# Patient Record
Sex: MALE | Race: WHITE | NOT HISPANIC OR LATINO | Employment: OTHER | URBAN - METROPOLITAN AREA
[De-identification: names, ages, dates, MRNs, and addresses within clinical notes are randomized per-mention and may not be internally consistent; named-entity substitution may affect disease eponyms.]

---

## 2017-03-10 RX ORDER — AMLODIPINE BESYLATE 5 MG/1
TABLET ORAL
Qty: 90 TABLET | Refills: 1 | Status: SHIPPED | OUTPATIENT
Start: 2017-03-10 | End: 2017-03-14 | Stop reason: SDUPTHER

## 2017-03-10 RX ORDER — ROSUVASTATIN CALCIUM 20 MG/1
TABLET, FILM COATED ORAL
Qty: 90 TABLET | Refills: 1 | Status: SHIPPED | OUTPATIENT
Start: 2017-03-10 | End: 2017-03-14 | Stop reason: SDUPTHER

## 2017-03-14 RX ORDER — AMLODIPINE BESYLATE 5 MG/1
5 TABLET ORAL DAILY
Qty: 90 TABLET | Refills: 1 | Status: SHIPPED | OUTPATIENT
Start: 2017-03-14 | End: 2017-09-08 | Stop reason: SDUPTHER

## 2017-03-14 RX ORDER — ROSUVASTATIN CALCIUM 20 MG/1
20 TABLET, COATED ORAL DAILY
Qty: 90 TABLET | Refills: 1 | Status: SHIPPED | OUTPATIENT
Start: 2017-03-14 | End: 2017-09-08

## 2017-06-06 RX ORDER — LOSARTAN POTASSIUM 100 MG/1
TABLET ORAL
Qty: 90 TABLET | Refills: 1 | Status: SHIPPED | OUTPATIENT
Start: 2017-06-06 | End: 2017-12-03 | Stop reason: SDUPTHER

## 2017-08-01 ENCOUNTER — TELEPHONE (OUTPATIENT)
Dept: CARDIOLOGY | Facility: CLINIC | Age: 72
End: 2017-08-01

## 2017-08-01 NOTE — TELEPHONE ENCOUNTER
Patient states he has been having pain in his left side for a couple weeks, and he read an article in the [employer] that said statins may cause this pain.  He recently received the generic form or Crestor and wanted to know if this formulation could be the issue.    Patient will hold the medication for ten days and call back with results.

## 2017-09-08 RX ORDER — AMLODIPINE BESYLATE 5 MG/1
5 TABLET ORAL DAILY
Qty: 90 TABLET | Refills: 2 | Status: SHIPPED | OUTPATIENT
Start: 2017-09-08 | End: 2018-06-10 | Stop reason: SDUPTHER

## 2017-09-08 RX ORDER — HYDROCHLOROTHIAZIDE 25 MG/1
TABLET ORAL
Qty: 90 TABLET | Refills: 2 | OUTPATIENT
Start: 2017-09-08

## 2017-09-08 RX ORDER — ROSUVASTATIN CALCIUM 20 MG/1
TABLET, COATED ORAL
Qty: 90 TABLET | Refills: 1 | OUTPATIENT
Start: 2017-09-08

## 2017-09-08 RX ORDER — AMLODIPINE BESYLATE 5 MG/1
TABLET ORAL
Qty: 90 TABLET | Refills: 1 | OUTPATIENT
Start: 2017-09-08

## 2017-09-08 RX ORDER — ROSUVASTATIN CALCIUM 20 MG/1
20 TABLET, FILM COATED ORAL DAILY
Qty: 90 TABLET | Refills: 2 | Status: SHIPPED | OUTPATIENT
Start: 2017-09-08 | End: 2017-12-21 | Stop reason: SDUPTHER

## 2017-09-08 RX ORDER — HYDROCHLOROTHIAZIDE 25 MG/1
25 TABLET ORAL DAILY
Qty: 90 TABLET | Refills: 2 | Status: SHIPPED | OUTPATIENT
Start: 2017-09-08 | End: 2018-07-10 | Stop reason: SDUPTHER

## 2017-10-13 ENCOUNTER — TELEPHONE (OUTPATIENT)
Dept: CARDIOLOGY | Facility: CLINIC | Age: 72
End: 2017-10-13

## 2017-10-13 NOTE — TELEPHONE ENCOUNTER
Patient was denied for brand named Crestor.  Patient wants to discuss options.    LVM for patient stating if he signs denial letter we can submit an appeal.  Gave him our fax number.

## 2017-11-14 ENCOUNTER — OFFICE VISIT (OUTPATIENT)
Dept: NEUROSURGERY | Facility: CLINIC | Age: 72
End: 2017-11-14

## 2017-11-14 VITALS — BODY MASS INDEX: 35.84 KG/M2 | HEIGHT: 69 IN | TEMPERATURE: 98.2 F | WEIGHT: 242 LBS

## 2017-11-14 DIAGNOSIS — M47.812 CERVICAL SPONDYLOSIS WITHOUT MYELOPATHY: ICD-10-CM

## 2017-11-14 DIAGNOSIS — M54.12 CERVICAL RADICULOPATHY: Primary | ICD-10-CM

## 2017-11-14 DIAGNOSIS — M47.812 CERVICAL ARTHRITIS: ICD-10-CM

## 2017-11-14 DIAGNOSIS — M50.30 DEGENERATIVE DISC DISEASE, CERVICAL: ICD-10-CM

## 2017-11-14 PROCEDURE — 99213 OFFICE O/P EST LOW 20 MIN: CPT | Performed by: NEUROLOGICAL SURGERY

## 2017-11-14 NOTE — PROGRESS NOTES
Patient: Agustín Child  : 1945    Primary Care Provider: Estefany Goldstein MD    Requesting Provider: As above      History    Chief Complaint: Neck and left arm pain.    History of Present Illness: Mr. Child is a 72 old  who is well-known to my service.  On 8/24/15 he underwent L4-5 decompressive laminectomy.  He's generally done well with that.  In July of this year he developed some neck and left arm pain.  Physical therapy was attempted but worsened his pain.  He's had trouble sleeping as a result of the pain.  The last several days he has been wearing a hard collar at night and his symptoms are improved.  He sometimes has tingling in his left hand.  He has no right arm symptoms.  He is worse in the mornings and at the end of the day.  He generally does better when he is up and about.  The pain extends into the left upper arm and occasionally to the wrist.  He has no pain in his hand or fingers.    Review of Systems   Constitutional: Negative for activity change, appetite change, chills, diaphoresis, fatigue, fever and unexpected weight change.   HENT: Negative for congestion, dental problem, drooling, ear discharge, ear pain, facial swelling, hearing loss, mouth sores, nosebleeds, postnasal drip, rhinorrhea, sinus pressure, sneezing, sore throat, tinnitus, trouble swallowing and voice change.    Eyes: Negative for photophobia, pain, discharge, redness, itching and visual disturbance.   Respiratory: Negative for apnea, cough, choking, chest tightness, shortness of breath, wheezing and stridor.    Cardiovascular: Negative for chest pain, palpitations and leg swelling.   Gastrointestinal: Negative for abdominal distention, abdominal pain, anal bleeding, blood in stool, constipation, diarrhea, nausea, rectal pain and vomiting.   Endocrine: Negative for cold intolerance, heat intolerance, polydipsia, polyphagia and polyuria.   Genitourinary: Negative for decreased urine volume, difficulty urinating,  "dysuria, enuresis, flank pain, frequency, genital sores, hematuria and urgency.   Musculoskeletal: Positive for myalgias and neck pain. Negative for arthralgias, back pain, gait problem, joint swelling and neck stiffness.   Skin: Negative for color change, pallor, rash and wound.   Allergic/Immunologic: Negative for environmental allergies, food allergies and immunocompromised state.   Neurological: Negative for dizziness, tremors, seizures, syncope, facial asymmetry, speech difficulty, weakness, light-headedness, numbness and headaches.   Hematological: Negative for adenopathy. Does not bruise/bleed easily.   Psychiatric/Behavioral: Negative for agitation, behavioral problems, confusion, decreased concentration, dysphoric mood, hallucinations, self-injury, sleep disturbance and suicidal ideas. The patient is not nervous/anxious and is not hyperactive.        The patient's past medical history, past surgical history, family history, and social history have been reviewed at length in the electronic medical record.    Physical Exam:   Temp 98.2 °F (36.8 °C) (Temporal Artery )   Ht 69\" (175.3 cm)  Wt 242 lb (110 kg)  BMI 35.74 kg/m2  CONSTITUTIONAL: Patient is well-nourished, pleasant and appears stated age.  CV: Heart regular rate and rhythm without murmur, rub, or gallop.  PULMONARY: Lungs are clear to ascultation.  MUSCULOSKELETAL:  Neck tenderness to palpation is not observed.   ROM in neck is normal.  NEUROLOGICAL:  Orientation, memory, attention span, language function, and cognition have been examined and are intact.  Strength is intact in the upper and lower extremities to direct testing.  Muscle tone is normal throughout.  Station and gait are normal.  Sensation is intact to light touch testing throughout.  Deep tendon reflexes are 1+ and symmetrical.  Deonte's Sign is negative bilaterally. No clonus is elicited.  Coordination is intact.      Medical Decision Making    Data Review:   MRI of the cervical " spine is a limited study.  There are only axial slices through C3-4.  I suspect they've neglected to put the other sequences on the disc.  There is diffuse degenerative disc disease and spondylosis.  There is some broad-based spurring at multiple levels.  There is a low-grade offset of C7 on T1.    Diagnosis:   Cervical spondylosis with left upper extremity radiculopathy, improved.    Treatment Options:   The patient is going to give this time.  He will continue his collar use at night.  If his symptoms persist or worsen then I recommended either epidural injections or cervical CT myelography to delineate the specific sites of nerve root compromise.  He is a good deal of traveling planned.  He'll contact me if we need to pursue further treatment measures.       Diagnosis Plan   1. Cervical radiculopathy     2. Cervical spondylosis without myelopathy     3. Cervical arthritis     4. Degenerative disc disease, cervical             I, Dr. Shaw, personally performed the services described in the documentation, as scribed in my presence, and it is both accurate and complete.

## 2017-12-04 RX ORDER — LOSARTAN POTASSIUM 100 MG/1
TABLET ORAL
Qty: 90 TABLET | Refills: 1 | Status: SHIPPED | OUTPATIENT
Start: 2017-12-04 | End: 2018-06-10 | Stop reason: SDUPTHER

## 2017-12-21 RX ORDER — ROSUVASTATIN CALCIUM 20 MG/1
20 TABLET, COATED ORAL DAILY
Qty: 90 TABLET | Refills: 2 | Status: SHIPPED | OUTPATIENT
Start: 2017-12-21 | End: 2018-09-11 | Stop reason: SDUPTHER

## 2018-01-12 ENCOUNTER — OFFICE VISIT (OUTPATIENT)
Dept: CARDIOLOGY | Facility: CLINIC | Age: 73
End: 2018-01-12

## 2018-01-12 VITALS
HEART RATE: 77 BPM | BODY MASS INDEX: 35.25 KG/M2 | SYSTOLIC BLOOD PRESSURE: 118 MMHG | WEIGHT: 238 LBS | HEIGHT: 69 IN | DIASTOLIC BLOOD PRESSURE: 64 MMHG

## 2018-01-12 DIAGNOSIS — I10 ESSENTIAL HYPERTENSION: Primary | ICD-10-CM

## 2018-01-12 DIAGNOSIS — E78.5 DYSLIPIDEMIA: ICD-10-CM

## 2018-01-12 DIAGNOSIS — R74.8 ELEVATED CPK: ICD-10-CM

## 2018-01-12 PROCEDURE — 99213 OFFICE O/P EST LOW 20 MIN: CPT | Performed by: INTERNAL MEDICINE

## 2018-01-12 RX ORDER — NITROFURANTOIN MACROCRYSTALS 100 MG/1
100 CAPSULE ORAL 2 TIMES DAILY PRN
COMMUNITY
End: 2018-08-17

## 2018-01-12 RX ORDER — HYDROCODONE BITARTRATE AND ACETAMINOPHEN 7.5; 325 MG/1; MG/1
TABLET ORAL AS NEEDED
Refills: 0 | COMMUNITY
Start: 2018-01-03 | End: 2018-08-17

## 2018-01-12 RX ORDER — GLIMEPIRIDE 2 MG/1
1 TABLET ORAL 2 TIMES DAILY
COMMUNITY
End: 2018-08-17

## 2018-01-12 RX ORDER — FINASTERIDE 5 MG/1
5 TABLET, FILM COATED ORAL DAILY
COMMUNITY
End: 2018-08-17

## 2018-01-12 NOTE — PROGRESS NOTES
Encounter Date:01/12/2018      Patient ID: Agustín Child is a 72 y.o. male.    Chief Complaint: No chief complaint on file.      PROBLEM LIST:  1. Hypertension:  a. GXT myocardial perfusion study, 11/24/2009:  No  evidence of reversible ischemia, normal left ventricular systolic function, left ventricular ejection fraction of 63%.  Impaired exercise tolerance with a 38% HENRY.  b. Echocardiogram, 03/13/2012: Normal LV systolic function, EF 55% to 60%, normal valvular  morphology.    2. Dyslipidemia.  3. CPK elevation secondary to simvastatin.  4. Obesity, BMI 35.4.  5. Asthma.  6. Benign prostatic hypertrophy.  7. Glaucoma.  8. Osteoarthritis.  9. Diverticulosis.    History of Present Illness  Patient presents today for follow-up with a history of hypertension and other cardiac risk factors. Is doing well from a cardiac standpoint. Recently went in to have umbilical hernia surgery, and afterwards had constipation which he attributed to the anesthesia and eating certain foods.  Symptoms continued so he went to the ED and was diagnosed with a UTI/sepsis with leukocytosis.  He was given an enema, treated with antibiotics and discharged with instructions to follow-up with a urologist.  His antihypertensives were adjusted. Since then he has been experiencing fatigue and mild lower extremity swelling. Denies chest pain, shortness of breath, leg swelling, palpitations, and syncope. Remains busy and active and plans to start walking regularly when he is in [place] for next 3 weeks.      No Known Allergies      Current Outpatient Prescriptions:   •  amLODIPine (NORVASC) 5 MG tablet, Take 1 tablet by mouth Daily., Disp: 90 tablet, Rfl: 2  •  aspirin 325 MG tablet, Take 325 mg by mouth Daily., Disp: , Rfl:   •  B Complex Vitamins (B COMPLEX 100 PO), Take  by mouth Daily., Disp: , Rfl:   •  budesonide-formoterol (SYMBICORT) 160-4.5 MCG/ACT inhaler, Inhale 2 puffs 2 (Two) Times a Day., Disp: , Rfl:   •  cholecalciferol  (VITAMIN D3) 1000 UNITS tablet, Take 2,000 Units by mouth Daily., Disp: , Rfl:   •  cycloSPORINE (RESTASIS) 0.05 % ophthalmic emulsion, Administer 1 drop to both eyes 2 (Two) Times a Day., Disp: , Rfl:   •  finasteride (PROSCAR) 5 MG tablet, Take 5 mg by mouth Daily., Disp: , Rfl:   •  fluticasone (FLONASE) 50 MCG/ACT nasal spray, 2 sprays into each nostril As Needed., Disp: , Rfl:   •  Glucosamine HCl (GLUCOSAMINE PO), Take 1 tablet by mouth Daily., Disp: , Rfl:   •  GUAIFENESIN PO, Take 400 mg by mouth 2 (Two) Times a Day As Needed., Disp: , Rfl:   •  hydrochlorothiazide (HYDRODIURIL) 25 MG tablet, Take 1 tablet by mouth Daily., Disp: 90 tablet, Rfl: 2  •  HYDROcodone-acetaminophen (NORCO) 7.5-325 MG per tablet, As Needed., Disp: , Rfl: 0  •  ipratropium (ATROVENT) 0.03 % nasal spray, 2 sprays into each nostril Every 12 (Twelve) Hours., Disp: , Rfl:   •  losartan (COZAAR) 100 MG tablet, TAKE 1 TABLET DAILY, Disp: 90 tablet, Rfl: 1  •  nitrofurantoin (MACRODANTIN) 100 MG capsule, Take 100 mg by mouth 2 (Two) Times a Day As Needed., Disp: , Rfl:   •  rosuvastatin (CRESTOR) 20 MG tablet, Take 1 tablet by mouth Daily., Disp: 90 tablet, Rfl: 2  •  tamsulosin (FLOMAX) 0.4 MG capsule 24 hr capsule, Take 1 capsule by mouth Every Night., Disp: , Rfl:   •  thiamine (VITAMIN B-1) 100 MG tablet, Take 100 mg by mouth Daily., Disp: , Rfl:   •  timolol (TIMOPTIC) 0.25 % ophthalmic solution, 1 drop 2 (Two) Times a Day., Disp: , Rfl:   •  vitamin E 400 UNIT capsule, Take 400 Units by mouth Daily., Disp: , Rfl:     The following portions of the patient's history were reviewed and updated as appropriate: allergies, current medications, past family history, past medical history, past social history, past surgical history and problem list.    ROS  Review of Systems   Constitution: Negative for chills, fever, weight gain and weight loss.   Cardiovascular: Negative for chest pain, claudication, dyspnea on exertion, leg swelling,  "orthopnea, palpitations, paroxysmal nocturnal dyspnea and syncope.        No dizziness   Gastrointestinal: Negative for abdominal pain, constipation, diarrhea, nausea and vomiting.   Genitourinary:        No urinary symptoms   Neurological:        No symptoms of stroke.   All other systems reviewed and are negative.    Objective:     Blood pressure 118/64, pulse 77, height 175.3 cm (69\"), weight 108 kg (238 lb).        Physical Exam  Constitutional: She appears well-developed and well-nourished.   HENT:   HEENT exam unremarkable.   Neck: Neck supple. No JVD present.   No carotid bruits.   Cardiovascular: Normal rate, regular rhythm and normal heart sounds.    No murmur heard.  2 plus symmetric pulses.   Pulmonary/Chest: Breath sounds normal. Does not exhibit tenderness.   Abdominal:   Abdomen benign.   Musculoskeletal: Does not exhibit edema.   Neurological:   Neurological exam unremarkable.   Vitals reviewed.    Lab Review:   Procedures       Assessment:      Diagnosis Plan   1. Essential hypertension  Compensated. Well controlled. Continue current medications. Exercise and control diet.   2. Dyslipidemia  Well controlled. Continue current medication regimen   3. Elevated CPK due to certain statins By history  Tolerating Crestor.       Plan:   Overall cardiac status is stable, he was encouraged to increase activities, exercise regularly also limited calories and try to lose weight specially abdominal fat.  Continue current medications, okay to take lower dose of HCTZ as prescribed on hospital discharge.  His blood pressure is well controlled.   FU in 6 MO, sooner as needed.  Thank you for allowing us to participate in the care of your patient.     Scribed for Vick Goldstein MD by Behzad Carnes. 1/12/2018  10:17 AM    I, Vick Goldstein MD, personally performed the services described in this documentation as scribed by the above named individual in my presence, and it is both accurate and complete.  1/12/2018  10:18 " AM        Please note that portions of this note may have been completed with a voice recognition program. Efforts were made to edit the dictations, but occasionally words are mistranscribed.

## 2018-02-13 ENCOUNTER — LAB (OUTPATIENT)
Dept: LAB | Facility: HOSPITAL | Age: 73
End: 2018-02-13
Attending: INTERNAL MEDICINE

## 2018-02-13 DIAGNOSIS — L40.50 PSA (PSORIATIC ARTHRITIS) (HCC): ICD-10-CM

## 2018-02-13 DIAGNOSIS — R74.8 ELEVATED CPK: ICD-10-CM

## 2018-02-13 DIAGNOSIS — I10 ESSENTIAL HYPERTENSION: ICD-10-CM

## 2018-02-13 DIAGNOSIS — E78.5 DYSLIPIDEMIA: ICD-10-CM

## 2018-02-13 DIAGNOSIS — N39.0 URINARY TRACT INFECTION WITHOUT HEMATURIA, SITE UNSPECIFIED: Primary | ICD-10-CM

## 2018-02-13 LAB
ALBUMIN SERPL-MCNC: 4.1 G/DL (ref 3.2–4.8)
ALBUMIN/GLOB SERPL: 1.8 G/DL (ref 1.5–2.5)
ALP SERPL-CCNC: 93 U/L (ref 25–100)
ALT SERPL W P-5'-P-CCNC: 36 U/L (ref 7–40)
ANION GAP SERPL CALCULATED.3IONS-SCNC: 3 MMOL/L (ref 3–11)
AST SERPL-CCNC: 26 U/L (ref 0–33)
BACTERIA UR QL AUTO: ABNORMAL /HPF
BASOPHILS # BLD AUTO: 0.05 10*3/MM3 (ref 0–0.2)
BASOPHILS NFR BLD AUTO: 0.5 % (ref 0–1)
BILIRUB SERPL-MCNC: 0.8 MG/DL (ref 0.3–1.2)
BILIRUB UR QL STRIP: NEGATIVE
BUN BLD-MCNC: 22 MG/DL (ref 9–23)
BUN/CREAT SERPL: 18.3 (ref 7–25)
CALCIUM SPEC-SCNC: 9.3 MG/DL (ref 8.7–10.4)
CHLORIDE SERPL-SCNC: 108 MMOL/L (ref 99–109)
CLARITY UR: CLEAR
CO2 SERPL-SCNC: 30 MMOL/L (ref 20–31)
COLOR UR: YELLOW
CREAT BLD-MCNC: 1.2 MG/DL (ref 0.6–1.3)
CRP SERPL-MCNC: 1.35 MG/DL (ref 0–1)
DEPRECATED RDW RBC AUTO: 46.7 FL (ref 37–54)
EOSINOPHIL # BLD AUTO: 0.42 10*3/MM3 (ref 0–0.3)
EOSINOPHIL NFR BLD AUTO: 4.4 % (ref 0–3)
ERYTHROCYTE [DISTWIDTH] IN BLOOD BY AUTOMATED COUNT: 14.7 % (ref 11.3–14.5)
ERYTHROCYTE [SEDIMENTATION RATE] IN BLOOD: 25 MM/HR (ref 0–20)
GFR SERPL CREATININE-BSD FRML MDRD: 59 ML/MIN/1.73
GLOBULIN UR ELPH-MCNC: 2.3 GM/DL
GLUCOSE BLD-MCNC: 96 MG/DL (ref 70–100)
GLUCOSE UR STRIP-MCNC: NEGATIVE MG/DL
HCT VFR BLD AUTO: 40.1 % (ref 38.9–50.9)
HGB BLD-MCNC: 13.6 G/DL (ref 13.1–17.5)
HGB UR QL STRIP.AUTO: NEGATIVE
HYALINE CASTS UR QL AUTO: ABNORMAL /LPF
IMM GRANULOCYTES # BLD: 0.05 10*3/MM3 (ref 0–0.03)
IMM GRANULOCYTES NFR BLD: 0.5 % (ref 0–0.6)
KETONES UR QL STRIP: NEGATIVE
LEUKOCYTE ESTERASE UR QL STRIP.AUTO: NEGATIVE
LYMPHOCYTES # BLD AUTO: 2.31 10*3/MM3 (ref 0.6–4.8)
LYMPHOCYTES NFR BLD AUTO: 24.2 % (ref 24–44)
MCH RBC QN AUTO: 29.6 PG (ref 27–31)
MCHC RBC AUTO-ENTMCNC: 33.9 G/DL (ref 32–36)
MCV RBC AUTO: 87.4 FL (ref 80–99)
MONOCYTES # BLD AUTO: 0.72 10*3/MM3 (ref 0–1)
MONOCYTES NFR BLD AUTO: 7.5 % (ref 0–12)
MUCOUS THREADS URNS QL MICRO: ABNORMAL /HPF
NEUTROPHILS # BLD AUTO: 6 10*3/MM3 (ref 1.5–8.3)
NEUTROPHILS NFR BLD AUTO: 62.9 % (ref 41–71)
NITRITE UR QL STRIP: NEGATIVE
PH UR STRIP.AUTO: <=5 [PH] (ref 5–8)
PLATELET # BLD AUTO: 173 10*3/MM3 (ref 150–450)
PMV BLD AUTO: 10.2 FL (ref 6–12)
POTASSIUM BLD-SCNC: 4.1 MMOL/L (ref 3.5–5.5)
PROT SERPL-MCNC: 6.4 G/DL (ref 5.7–8.2)
PROT UR QL STRIP: NEGATIVE
RBC # BLD AUTO: 4.59 10*6/MM3 (ref 4.2–5.76)
RBC # UR: ABNORMAL /HPF
REF LAB TEST METHOD: ABNORMAL
SODIUM BLD-SCNC: 141 MMOL/L (ref 132–146)
SP GR UR STRIP: 1.02 (ref 1–1.03)
SQUAMOUS #/AREA URNS HPF: ABNORMAL /HPF
UROBILINOGEN UR QL STRIP: NORMAL
WBC NRBC COR # BLD: 9.55 10*3/MM3 (ref 3.5–10.8)
WBC UR QL AUTO: ABNORMAL /HPF

## 2018-02-13 PROCEDURE — 36415 COLL VENOUS BLD VENIPUNCTURE: CPT

## 2018-02-13 PROCEDURE — 85025 COMPLETE CBC W/AUTO DIFF WBC: CPT

## 2018-02-13 PROCEDURE — 87086 URINE CULTURE/COLONY COUNT: CPT

## 2018-02-13 PROCEDURE — 85652 RBC SED RATE AUTOMATED: CPT

## 2018-02-13 PROCEDURE — 80053 COMPREHEN METABOLIC PANEL: CPT

## 2018-02-13 PROCEDURE — 86140 C-REACTIVE PROTEIN: CPT

## 2018-02-13 PROCEDURE — G0103 PSA SCREENING: HCPCS

## 2018-02-13 PROCEDURE — 81001 URINALYSIS AUTO W/SCOPE: CPT

## 2018-02-14 LAB — PSA SERPL-MCNC: 5.46 NG/ML (ref 0–4)

## 2018-02-15 LAB — BACTERIA SPEC AEROBE CULT: NORMAL

## 2018-06-11 RX ORDER — AMLODIPINE BESYLATE 5 MG/1
TABLET ORAL
Qty: 90 TABLET | Refills: 2 | Status: SHIPPED | OUTPATIENT
Start: 2018-06-11 | End: 2019-03-08 | Stop reason: SDUPTHER

## 2018-06-11 RX ORDER — LOSARTAN POTASSIUM 100 MG/1
TABLET ORAL
Qty: 90 TABLET | Refills: 2 | Status: SHIPPED | OUTPATIENT
Start: 2018-06-11 | End: 2018-09-11 | Stop reason: SDUPTHER

## 2018-07-10 RX ORDER — HYDROCHLOROTHIAZIDE 25 MG/1
TABLET ORAL
Qty: 90 TABLET | Refills: 2 | Status: SHIPPED | OUTPATIENT
Start: 2018-07-10 | End: 2019-10-03 | Stop reason: SDUPTHER

## 2018-08-17 ENCOUNTER — OFFICE VISIT (OUTPATIENT)
Dept: CARDIOLOGY | Facility: CLINIC | Age: 73
End: 2018-08-17

## 2018-08-17 VITALS
HEIGHT: 69 IN | BODY MASS INDEX: 35.4 KG/M2 | HEART RATE: 76 BPM | OXYGEN SATURATION: 97 % | SYSTOLIC BLOOD PRESSURE: 138 MMHG | DIASTOLIC BLOOD PRESSURE: 66 MMHG | WEIGHT: 239 LBS

## 2018-08-17 DIAGNOSIS — E78.5 DYSLIPIDEMIA: ICD-10-CM

## 2018-08-17 DIAGNOSIS — I10 ESSENTIAL HYPERTENSION: Primary | ICD-10-CM

## 2018-08-17 PROCEDURE — 99213 OFFICE O/P EST LOW 20 MIN: CPT | Performed by: INTERNAL MEDICINE

## 2018-08-17 RX ORDER — LORATADINE 10 MG/1
CAPSULE, LIQUID FILLED ORAL DAILY
COMMUNITY

## 2018-08-17 NOTE — PROGRESS NOTES
Encounter Date:08/17/2018      Patient ID: Agustín Child is a 72 y.o. male.    Chief Complaint: Hypertension      PROBLEM LIST:  1. Hypertension:  a. GXT myocardial perfusion study, 11/24/2009:  No  evidence of reversible ischemia, normal left ventricular systolic function, left ventricular ejection fraction of 63%.  Impaired exercise tolerance with a 38% HENRY.  b. Echocardiogram, 03/13/2012: Normal LV systolic function, EF 55% to 60%, normal valvular  morphology.    2. Dyslipidemia.  3. CPK elevation secondary to simvastatin.  4. Obesity, BMI 35.4.  5. Asthma.  6. Benign prostatic hypertrophy.  7. Glaucoma.  8. Osteoarthritis.  9. Diverticulosis.    History of Present Illness  Patient presents today for follow-up with a history of hypertension and other cardiac risk factors. Since last visit has been doing well from a cardiac standpoint. Denies chest pain, shortness of breath, leg swelling, palpitations, and syncope. Remains busy and active with no limitations. At the moment he travels back and forth from [place].     No Known Allergies      Current Outpatient Prescriptions:   •  amLODIPine (NORVASC) 5 MG tablet, TAKE 1 TABLET DAILY, Disp: 90 tablet, Rfl: 2  •  aspirin 325 MG tablet, Take 325 mg by mouth Daily., Disp: , Rfl:   •  B Complex Vitamins (B COMPLEX 100 PO), Take  by mouth Daily., Disp: , Rfl:   •  bimatoprost (LUMIGAN) 0.01 % ophthalmic drops, 1 drop Every Night., Disp: , Rfl:   •  budesonide-formoterol (SYMBICORT) 160-4.5 MCG/ACT inhaler, Inhale 2 puffs 2 (Two) Times a Day., Disp: , Rfl:   •  cholecalciferol (VITAMIN D3) 1000 UNITS tablet, Take 2,000 Units by mouth Daily., Disp: , Rfl:   •  cycloSPORINE (RESTASIS) 0.05 % ophthalmic emulsion, Administer 1 drop to both eyes 2 (Two) Times a Day., Disp: , Rfl:   •  fluticasone (FLONASE) 50 MCG/ACT nasal spray, 2 sprays into each nostril As Needed., Disp: , Rfl:   •  Glucosamine HCl (GLUCOSAMINE PO), Take 1 tablet by mouth Daily., Disp: , Rfl:   •   "hydrochlorothiazide (HYDRODIURIL) 25 MG tablet, TAKE 1 TABLET DAILY, Disp: 90 tablet, Rfl: 2  •  ipratropium (ATROVENT) 0.03 % nasal spray, 2 sprays into each nostril Every 12 (Twelve) Hours., Disp: , Rfl:   •  Loratadine 10 MG capsule, Take  by mouth Daily., Disp: , Rfl:   •  losartan (COZAAR) 100 MG tablet, TAKE 1 TABLET DAILY, Disp: 90 tablet, Rfl: 2  •  rosuvastatin (CRESTOR) 20 MG tablet, Take 1 tablet by mouth Daily., Disp: 90 tablet, Rfl: 2  •  tamsulosin (FLOMAX) 0.4 MG capsule 24 hr capsule, Take 1 capsule by mouth Every Night., Disp: , Rfl:   •  thiamine (VITAMIN B-1) 100 MG tablet, Take 100 mg by mouth Daily., Disp: , Rfl:   •  vitamin E 400 UNIT capsule, Take 400 Units by mouth Daily., Disp: , Rfl:     The following portions of the patient's history were reviewed and updated as appropriate: allergies, current medications, past family history, past medical history, past social history, past surgical history and problem list.    ROS  Review of Systems   Constitution: Negative for chills, fever, weight gain and weight loss.   Cardiovascular: Negative for chest pain, claudication, dyspnea on exertion, leg swelling, orthopnea, palpitations, paroxysmal nocturnal dyspnea and syncope.        No dizziness   Gastrointestinal: Negative for abdominal pain, constipation, diarrhea, nausea and vomiting.   Genitourinary:        No urinary symptoms   Neurological:        No symptoms of stroke.   All other systems reviewed and are negative.    Objective:     Blood pressure 138/66, pulse 76, height 175.3 cm (69\"), weight 108 kg (239 lb), SpO2 97 %.  Repeat blood pressure measurement by, Vick Goldstein MD, at 118/70.      Physical Exam  Constitutional: She appears well-developed and well-nourished.   HENT:   HEENT exam unremarkable.   Neck: Neck supple. No JVD present.   No carotid bruits.   Cardiovascular: Normal rate, regular rhythm and normal heart sounds.    No murmur heard.  2 plus symmetric pulses.   Pulmonary/Chest: " Breath sounds normal. Does not exhibit tenderness.   Abdominal:   Abdomen benign.   Musculoskeletal: Does not exhibit edema.   Neurological:   Neurological exam unremarkable.   Vitals reviewed.    Lab Review:   Procedures       Assessment:      Diagnosis Plan   1. Essential hypertension  Well controlled with current medication regimen. CMP   2. Dyslipidemia. LDL goal <70. Continue Crestor 20 mg daily. Lipid panel and CMP.     Plan:   Check CMP and Lipid panel.  Monitor diet with decreased caloric intake.   Begin regular exercise regimen.   Continue current medications.   FU in 12 MO, sooner as needed.  Thank you for allowing us to participate in the care of your patient.     Scribed for Vick Goldstein MD by Behzad Carnes. 8/17/2018  2:30 PM    I, Vick Goldstein MD, personally performed the services described in this documentation as scribed by the above named individual in my presence, and it is both accurate and complete.  8/17/2018  3:55 PM        Please note that portions of this note may have been completed with a voice recognition program. Efforts were made to edit the dictations, but occasionally words are mistranscribed.

## 2018-09-11 RX ORDER — ROSUVASTATIN CALCIUM 20 MG/1
20 TABLET, COATED ORAL DAILY
Qty: 90 TABLET | Refills: 2 | Status: SHIPPED | OUTPATIENT
Start: 2018-09-11 | End: 2019-07-08 | Stop reason: SDUPTHER

## 2018-09-11 RX ORDER — LOSARTAN POTASSIUM 100 MG/1
100 TABLET ORAL DAILY
Qty: 90 TABLET | Refills: 2 | Status: SHIPPED | OUTPATIENT
Start: 2018-09-11 | End: 2019-08-12 | Stop reason: SDUPTHER

## 2019-03-08 RX ORDER — AMLODIPINE BESYLATE 5 MG/1
TABLET ORAL
Qty: 90 TABLET | Refills: 3 | Status: SHIPPED | OUTPATIENT
Start: 2019-03-08 | End: 2020-02-14

## 2019-07-08 RX ORDER — ROSUVASTATIN CALCIUM 20 MG/1
20 TABLET, COATED ORAL DAILY
Qty: 90 TABLET | Refills: 0 | Status: SHIPPED | OUTPATIENT
Start: 2019-07-08 | End: 2019-10-03 | Stop reason: SDUPTHER

## 2019-08-12 RX ORDER — LOSARTAN POTASSIUM 100 MG/1
TABLET ORAL
Qty: 90 TABLET | Refills: 2 | Status: SHIPPED | OUTPATIENT
Start: 2019-08-12 | End: 2020-05-26 | Stop reason: SDUPTHER

## 2019-09-06 ENCOUNTER — OFFICE VISIT (OUTPATIENT)
Dept: CARDIOLOGY | Facility: CLINIC | Age: 74
End: 2019-09-06

## 2019-09-06 VITALS
DIASTOLIC BLOOD PRESSURE: 56 MMHG | WEIGHT: 241 LBS | HEIGHT: 69 IN | SYSTOLIC BLOOD PRESSURE: 114 MMHG | BODY MASS INDEX: 35.7 KG/M2 | HEART RATE: 79 BPM

## 2019-09-06 DIAGNOSIS — E78.5 DYSLIPIDEMIA: ICD-10-CM

## 2019-09-06 DIAGNOSIS — I10 ESSENTIAL HYPERTENSION: Primary | ICD-10-CM

## 2019-09-06 PROCEDURE — 99214 OFFICE O/P EST MOD 30 MIN: CPT | Performed by: INTERNAL MEDICINE

## 2019-09-06 NOTE — PROGRESS NOTES
[facility] Cardiology    Encounter Date:09/06/2019        Patient ID: Agustín Child is a 74 y.o. male.    PCP: Estefany Goldstein MD     Chief Complaint: Essential hypertension      PROBLEM LIST:  1. Hypertension:  a. GXT myocardial perfusion study, 11/24/2009:  No  evidence of reversible ischemia, normal left ventricular systolic function, left ventricular ejection fraction of 63%.  Impaired exercise tolerance with a 38% HENRY.  b. Echocardiogram, 03/13/2012: Normal LV systolic function, EF 55% to 60%, normal valvular  morphology.    2. Dyslipidemia.  3. CPK elevation secondary to simvastatin.  4. Obesity, BMI 35.4.  5. Asthma.  6. Benign prostatic hypertrophy.  7. Glaucoma.  8. Osteoarthritis.  9. Diverticulosis.    History of Present Illness  Patient presents today for annual follow-up with a history of hypertension and dyslipidemia. Since last visit, he has been feeling well overall from a cardiovascular standpoint. He is currently followed by Dr. Uriarte for hematochezia since last May, states that he has been suffering with colitis and proctitis. He plans to move to [place] full-time by next year. Denies chest pain, shortness of breath, leg swelling, palpitations, and syncope. Remains busy and active with limitations due to his GI issues, which put him in the bathroom about a dozen times per day. He will be seeing GI in October. He reports a recent episode of chills at night which resolved and he did not have any subsequent fever.     No Known Allergies      Current Outpatient Medications:   •  amLODIPine (NORVASC) 5 MG tablet, TAKE 1 TABLET DAILY, Disp: 90 tablet, Rfl: 3  •  aspirin 325 MG tablet, Take 325 mg by mouth Daily., Disp: , Rfl:   •  B Complex Vitamins (B COMPLEX 100 PO), Take  by mouth Daily., Disp: , Rfl:   •  bimatoprost (LUMIGAN) 0.01 % ophthalmic drops, 1 drop Every Night., Disp: , Rfl:   •  budesonide-formoterol (SYMBICORT) 160-4.5 MCG/ACT inhaler, Inhale 2 puffs 2  (Two) Times a Day., Disp: , Rfl:   •  cholecalciferol (VITAMIN D3) 1000 UNITS tablet, Take 2,000 Units by mouth Daily., Disp: , Rfl:   •  cycloSPORINE (RESTASIS) 0.05 % ophthalmic emulsion, Administer 1 drop to both eyes 2 (Two) Times a Day., Disp: , Rfl:   •  fluticasone (FLONASE) 50 MCG/ACT nasal spray, 2 sprays into each nostril As Needed., Disp: , Rfl:   •  Glucosamine HCl (GLUCOSAMINE PO), Take 1 tablet by mouth Daily., Disp: , Rfl:   •  hydrochlorothiazide (HYDRODIURIL) 25 MG tablet, TAKE 1 TABLET DAILY, Disp: 90 tablet, Rfl: 2  •  ipratropium (ATROVENT) 0.03 % nasal spray, 2 sprays into each nostril Every 12 (Twelve) Hours., Disp: , Rfl:   •  Loratadine 10 MG capsule, Take  by mouth Daily., Disp: , Rfl:   •  losartan (COZAAR) 100 MG tablet, TAKE 1 TABLET DAILY, Disp: 90 tablet, Rfl: 2  •  rosuvastatin (CRESTOR) 20 MG tablet, Take 1 tablet by mouth Daily., Disp: 90 tablet, Rfl: 0  •  tamsulosin (FLOMAX) 0.4 MG capsule 24 hr capsule, Take 1 capsule by mouth Every Night., Disp: , Rfl:   •  thiamine (VITAMIN B-1) 100 MG tablet, Take 100 mg by mouth Daily., Disp: , Rfl:   •  vitamin E 400 UNIT capsule, Take 400 Units by mouth Daily., Disp: , Rfl:     The following portions of the patient's history were reviewed and updated as appropriate: allergies, current medications, past family history, past medical history, past social history, past surgical history and problem list.    ROS  Review of Systems   Constitution: Negative for fatigue, fever, generalized weakness, weight gain and weight loss. positive for chills.  Cardiovascular: Negative for chest pain, claudication, dyspnea on exertion, leg swelling, orthopnea, palpitations, paroxysmal nocturnal dyspnea and syncope.   Respiratory: Negative for cough, shortness of breath, and wheezing.  HENT: Negative for ear pain, nosebleeds, and tinnitus.  Gastrointestinal: Negative for abdominal pain, constipation, nausea and vomiting. Positive for diarrhea and hematochezia.  "  Genitourinary: No urinary symptoms   Musculoskeletal: Negative for muscle cramps.  Neurological: Negative for dizziness, headaches, loss of balance, numbness, and symptoms of stroke.  Psychiatric: Normal mental status.     All other systems reviewed and are negative.    Objective:     /56 (BP Location: Right arm, Patient Position: Sitting)   Pulse 79   Ht 175.3 cm (69\")   Wt 109 kg (241 lb)   BMI 35.59 kg/m²        Physical Exam  Constitutional: Patient appears well-developed and well-nourished.   HENT: HEENT exam unremarkable.   Neck: Neck supple. No JVD present. No carotid bruits.   Cardiovascular: Normal rate, regular rhythm and normal heart sounds. No murmur heard.   2+ symmetric pulses.   Pulmonary/Chest: Breath sounds normal. Does not exhibit tenderness.   Abdominal: Abdomen benign.   Musculoskeletal: Does not exhibit edema.   Neurological: Neurological exam unremarkable.   Vitals reviewed.    Lab Review:      Procedures       Assessment:      Diagnosis Plan   1. Essential hypertension  Well-controlled, continue amlodipine, HCT, and losartan.   2. Dyslipidemia  Obtain FLP/CMP. Continue rosuvastatin 20 mg.     Plan:   Obtain FLP/CMP.  Patient advised to f/up with Dr. Uriarte sooner than his appointment in October, and to inform them of his recent chills and concern for bacteremia.  Stable cardiac status overall.  Continue current medications.   FU in 12 MO, sooner as needed.  Thank you for allowing us to participate in the care of your patient.     Scribed for Vick Goldstein MD by Kanika Ann. 9/6/2019  9:19 AM      I, Vick Goldstein MD, personally performed the services described in this documentation as scribed by the above named individual in my presence, and it is both accurate and complete.  9/6/2019  9:35 AM        Please note that portions of this note may have been completed with a voice recognition program. Efforts were made to edit the dictations, but occasionally words are " mistranscribed.

## 2019-10-04 RX ORDER — HYDROCHLOROTHIAZIDE 25 MG/1
TABLET ORAL
Qty: 90 TABLET | Refills: 1 | Status: SHIPPED | OUTPATIENT
Start: 2019-10-04 | End: 2020-10-12 | Stop reason: SDUPTHER

## 2019-10-04 RX ORDER — ROSUVASTATIN CALCIUM 20 MG/1
TABLET, COATED ORAL
Qty: 90 TABLET | Refills: 1 | Status: SHIPPED | OUTPATIENT
Start: 2019-10-04 | End: 2019-12-30 | Stop reason: SDUPTHER

## 2019-12-30 RX ORDER — ROSUVASTATIN CALCIUM 20 MG/1
20 TABLET, COATED ORAL DAILY
Qty: 90 TABLET | Refills: 1 | Status: SHIPPED | OUTPATIENT
Start: 2019-12-30 | End: 2020-06-29

## 2020-02-14 RX ORDER — AMLODIPINE BESYLATE 5 MG/1
TABLET ORAL
Qty: 90 TABLET | Refills: 4 | Status: SHIPPED | OUTPATIENT
Start: 2020-02-14 | End: 2021-05-10

## 2020-05-26 RX ORDER — LOSARTAN POTASSIUM 100 MG/1
100 TABLET ORAL DAILY
Qty: 90 TABLET | Refills: 1 | Status: SHIPPED | OUTPATIENT
Start: 2020-05-26 | End: 2020-05-27 | Stop reason: SDUPTHER

## 2020-05-27 RX ORDER — LOSARTAN POTASSIUM 100 MG/1
100 TABLET ORAL DAILY
Qty: 90 TABLET | Refills: 1 | Status: SHIPPED | OUTPATIENT
Start: 2020-05-27 | End: 2020-12-08 | Stop reason: SDUPTHER

## 2020-06-29 RX ORDER — ROSUVASTATIN CALCIUM 20 MG/1
TABLET, COATED ORAL
Qty: 90 TABLET | Refills: 1 | Status: SHIPPED | OUTPATIENT
Start: 2020-06-29 | End: 2021-01-04 | Stop reason: SDUPTHER

## 2020-10-09 ENCOUNTER — LAB (OUTPATIENT)
Dept: LAB | Facility: HOSPITAL | Age: 75
End: 2020-10-09

## 2020-10-09 ENCOUNTER — OFFICE VISIT (OUTPATIENT)
Dept: CARDIOLOGY | Facility: CLINIC | Age: 75
End: 2020-10-09

## 2020-10-09 VITALS
TEMPERATURE: 97.7 F | WEIGHT: 244 LBS | DIASTOLIC BLOOD PRESSURE: 62 MMHG | HEART RATE: 74 BPM | SYSTOLIC BLOOD PRESSURE: 120 MMHG | OXYGEN SATURATION: 97 % | BODY MASS INDEX: 36.14 KG/M2 | HEIGHT: 69 IN

## 2020-10-09 DIAGNOSIS — E78.5 DYSLIPIDEMIA: ICD-10-CM

## 2020-10-09 DIAGNOSIS — I10 ESSENTIAL HYPERTENSION: ICD-10-CM

## 2020-10-09 DIAGNOSIS — I10 ESSENTIAL HYPERTENSION: Primary | ICD-10-CM

## 2020-10-09 DIAGNOSIS — E66.9 CLASS 2 OBESITY WITH BODY MASS INDEX (BMI) OF 36.0 TO 36.9 IN ADULT, UNSPECIFIED OBESITY TYPE, UNSPECIFIED WHETHER SERIOUS COMORBIDITY PRESENT: ICD-10-CM

## 2020-10-09 LAB
ALBUMIN SERPL-MCNC: 4.1 G/DL (ref 3.5–5.2)
ALBUMIN/GLOB SERPL: 1.8 G/DL
ALP SERPL-CCNC: 75 U/L (ref 39–117)
ALT SERPL W P-5'-P-CCNC: 26 U/L (ref 1–41)
ANION GAP SERPL CALCULATED.3IONS-SCNC: 8.7 MMOL/L (ref 5–15)
AST SERPL-CCNC: 22 U/L (ref 1–40)
BILIRUB SERPL-MCNC: 0.6 MG/DL (ref 0–1.2)
BUN SERPL-MCNC: 17 MG/DL (ref 8–23)
BUN/CREAT SERPL: 14.2 (ref 7–25)
CALCIUM SPEC-SCNC: 9.6 MG/DL (ref 8.6–10.5)
CHLORIDE SERPL-SCNC: 105 MMOL/L (ref 98–107)
CHOLEST SERPL-MCNC: 143 MG/DL (ref 0–200)
CO2 SERPL-SCNC: 27.3 MMOL/L (ref 22–29)
CREAT SERPL-MCNC: 1.2 MG/DL (ref 0.76–1.27)
GFR SERPL CREATININE-BSD FRML MDRD: 59 ML/MIN/1.73
GLOBULIN UR ELPH-MCNC: 2.3 GM/DL
GLUCOSE SERPL-MCNC: 104 MG/DL (ref 65–99)
HDLC SERPL-MCNC: 41 MG/DL (ref 40–60)
LDLC SERPL CALC-MCNC: 74 MG/DL (ref 0–100)
LDLC/HDLC SERPL: 1.71 {RATIO}
POTASSIUM SERPL-SCNC: 4.1 MMOL/L (ref 3.5–5.2)
PROT SERPL-MCNC: 6.4 G/DL (ref 6–8.5)
SODIUM SERPL-SCNC: 141 MMOL/L (ref 136–145)
TRIGL SERPL-MCNC: 160 MG/DL (ref 0–150)
VLDLC SERPL-MCNC: 28 MG/DL (ref 5–40)

## 2020-10-09 PROCEDURE — 99214 OFFICE O/P EST MOD 30 MIN: CPT | Performed by: INTERNAL MEDICINE

## 2020-10-09 PROCEDURE — 80053 COMPREHEN METABOLIC PANEL: CPT

## 2020-10-09 PROCEDURE — 36415 COLL VENOUS BLD VENIPUNCTURE: CPT

## 2020-10-09 PROCEDURE — 80061 LIPID PANEL: CPT

## 2020-10-09 RX ORDER — MESALAMINE 400 MG/1
800 CAPSULE, DELAYED RELEASE ORAL 2 TIMES DAILY
COMMUNITY

## 2020-10-09 RX ORDER — TIMOLOL MALEATE 5 MG/ML
1 SOLUTION/ DROPS OPHTHALMIC 2 TIMES DAILY
COMMUNITY

## 2020-10-09 RX ORDER — ALBUTEROL SULFATE 90 UG/1
2 AEROSOL, METERED RESPIRATORY (INHALATION) EVERY 4 HOURS PRN
COMMUNITY

## 2020-10-09 RX ORDER — TESTOSTERONE 30 MG/1.5ML
SOLUTION TOPICAL AS NEEDED
COMMUNITY

## 2020-10-09 NOTE — PROGRESS NOTES
[facility] Cardiology    Encounter Date: 10/09/2020    Patient ID: Agustín Child is a 75 y.o. male.  : 1945     PCP: Estefany Goldstein MD       Chief Complaint: Essential hypertension      PROBLEM LIST:  1. Hypertension:  a. GXT myocardial perfusion study, 2009:  No  evidence of reversible ischemia, normal left ventricular systolic function, left ventricular ejection fraction of 63%.  Impaired exercise tolerance with a 38% HENRY.  b. Echocardiogram, 2012: Normal LV systolic function, EF 55% to 60%, normal valvular  morphology.    2. Dyslipidemia.  3. CPK elevation secondary to simvastatin.  4. Obesity, BMI 36.03.  5. Asthma.  6. Benign prostatic hypertrophy.  7. Glaucoma.  8. Osteoarthritis.  9. Diverticulosis.    History of Present Illness  Patient presents today for an annual follow-up with a history of essential hypertension and dyslipidemia. Since last visit, he has been feeling well overall from a cardiovascular standpoint. He drove back from [place] yesterday and will go back next week. He does not monitor his blood pressure at home. He notes that his weight gain is most likely because he hasn't been walking on the beach. It is easier for him to walk on the beach rather than asphalt due to his hip pain. He does not have a primary care provider in [place]. Patient otherwise denies chest pain, shortness of breath, PND, edema, palpitations, syncope, or presyncope at this time. He plans to move to [place] permanently at the end of the year.     No Known Allergies      Current Outpatient Medications:   •  albuterol sulfate  (90 Base) MCG/ACT inhaler, Inhale 2 puffs Every 4 (Four) Hours As Needed for Wheezing., Disp: , Rfl:   •  amLODIPine (NORVASC) 5 MG tablet, TAKE 1 TABLET DAILY, Disp: 90 tablet, Rfl: 4  •  aspirin 325 MG tablet, Take 325 mg by mouth Daily., Disp: , Rfl:   •  B Complex Vitamins (B COMPLEX 100 PO), Take  by mouth Daily., Disp: , Rfl:   •   bimatoprost (LUMIGAN) 0.01 % ophthalmic drops, 1 drop Every Night., Disp: , Rfl:   •  budesonide-formoterol (SYMBICORT) 160-4.5 MCG/ACT inhaler, Inhale 2 puffs 2 (Two) Times a Day., Disp: , Rfl:   •  cholecalciferol (VITAMIN D3) 1000 UNITS tablet, Take 2,000 Units by mouth Daily., Disp: , Rfl:   •  cycloSPORINE (RESTASIS) 0.05 % ophthalmic emulsion, Administer 1 drop to both eyes 2 (Two) Times a Day., Disp: , Rfl:   •  diclofenac (VOLTAREN) 1 % gel gel, Apply 4 g topically to the appropriate area as directed 4 (Four) Times a Day As Needed (rt knee)., Disp: , Rfl:   •  fluticasone (FLONASE) 50 MCG/ACT nasal spray, 2 sprays into each nostril As Needed., Disp: , Rfl:   •  Glucosamine HCl (GLUCOSAMINE PO), Take 1 tablet by mouth Daily., Disp: , Rfl:   •  hydroCHLOROthiazide (HYDRODIURIL) 25 MG tablet, TAKE 1 TABLET DAILY, Disp: 90 tablet, Rfl: 1  •  ipratropium (ATROVENT) 0.03 % nasal spray, 2 sprays into each nostril Every 12 (Twelve) Hours., Disp: , Rfl:   •  Loratadine 10 MG capsule, Take  by mouth Daily., Disp: , Rfl:   •  losartan (COZAAR) 100 MG tablet, Take 1 tablet by mouth Daily., Disp: 90 tablet, Rfl: 1  •  mesalamine (DELZICOL) 400 MG capsule delayed-release delayed release capsule, Take 800 mg by mouth 2 (Two) Times a Day., Disp: , Rfl:   •  rosuvastatin (CRESTOR) 20 MG tablet, TAKE 1 TABLET DAILY, Disp: 90 tablet, Rfl: 1  •  tamsulosin (FLOMAX) 0.4 MG capsule 24 hr capsule, Take 1 capsule by mouth Every Night., Disp: , Rfl:   •  Testosterone 30 MG/ACT solution, Apply  topically As Needed., Disp: , Rfl:   •  timolol (TIMOPTIC) 0.5 % ophthalmic solution, Administer 1 drop to both eyes 2 (Two) Times a Day., Disp: , Rfl:   •  vitamin E 400 UNIT capsule, Take 400 Units by mouth Daily., Disp: , Rfl:     The following portions of the patient's history were reviewed and updated as appropriate: allergies, current medications, past family history, past medical history, past social history, past surgical history and  "problem list.    ROS  Review of Systems   Constitution: Negative for chills, fever, fatigue, generalized weakness.   Cardiovascular: Negative for chest pain, dyspnea on exertion, leg swelling, palpitations, orthopnea, and syncope.   Respiratory: Negative for cough, shortness of breath, and wheezing.  HENT: Negative for ear pain, nosebleeds, and tinnitus.  Gastrointestinal: Negative for abdominal pain, constipation, diarrhea, nausea and vomiting.   Genitourinary: No urinary symptoms.  Musculoskeletal: Negative for muscle cramps.  Neurological: Negative for dizziness, headaches, loss of balance, numbness, and symptoms of stroke.  Psychiatric: Normal mental status.     All other systems reviewed and are negative.        Objective:     /62 (BP Location: Right arm, Patient Position: Sitting, Cuff Size: Adult)   Pulse 74   Temp 97.7 °F (36.5 °C)   Ht 175.3 cm (69\")   Wt 111 kg (244 lb)   SpO2 97%   BMI 36.03 kg/m²    Repeat BP: 136/70    Physical Exam  Constitutional: Patient appears well-developed and well-nourished.   HENT: HEENT exam unremarkable.   Neck: Neck supple. No JVD present. No carotid bruits.   Cardiovascular: Normal rate, regular rhythm and normal heart sounds. No murmur heard.   2+ symmetric pulses.   Pulmonary/Chest: Breath sounds normal. Does not exhibit tenderness.   Abdominal: Abdomen benign.   Musculoskeletal: Does not exhibit edema.   Neurological: Neurological exam unremarkable.   Vitals reviewed.    Data Review:   Lab date: 09/13/2019  • FLP: , , HDL 40, LDL 72  • CMP: Glu 106, BUN 17, Creat 1.27, eGFR 55, Na 141, K 4.1, Cl 108, CO2 27, Ca 9.3, Alk Phos 72, AST 21, ALT 27  • CBC: WBC 5.6, RBC 4.7, HGB 12, HCT 37.9, MCV 80.6, MCH 25.5,     Procedures       Assessment:      Diagnosis Plan   1. Essential hypertension  Well-controlled; continue current medications.   2. Dyslipidemia  FLP ordered; continue rosuvastatin 20 mg daily.    3. Class 2 obesity with body mass index " (BMI) of 36.0 to 36.9 in adult  Begin routine aerobic exercise for at least 30 minutes 5 days per week.      Plan:   Begin routine aerobic exercise for at least 30 minutes 5 days per week.   FLP and CMP ordered.  Continue current medications.  He did not require any prescription refills today.  He is planning to move to [place] permanently and will establish care with local physicians, he can follow-up with us as needed.  Thank you for allowing us to participate in the care of your patient.     Scribed for Vick Goldstein MD by Geeta Levin. 10/9/2020  10:20 EDT      I, Vick Goldstein MD, personally performed the services described in this documentation as scribed by the above named individual in my presence, and it is both accurate and complete.  10/9/2020  10:52 EDT        Please note that portions of this note may have been completed with a voice recognition program. Efforts were made to edit the dictations, but occasionally words are mistranscribed.

## 2020-10-12 RX ORDER — HYDROCHLOROTHIAZIDE 25 MG/1
25 TABLET ORAL DAILY
Qty: 90 TABLET | Refills: 3 | Status: SHIPPED | OUTPATIENT
Start: 2020-10-12

## 2020-12-08 RX ORDER — LOSARTAN POTASSIUM 100 MG/1
100 TABLET ORAL DAILY
Qty: 90 TABLET | Refills: 3 | Status: SHIPPED | OUTPATIENT
Start: 2020-12-08 | End: 2022-02-09 | Stop reason: SDUPTHER

## 2021-01-04 RX ORDER — ROSUVASTATIN CALCIUM 20 MG/1
20 TABLET, COATED ORAL DAILY
Qty: 90 TABLET | Refills: 3 | Status: SHIPPED | OUTPATIENT
Start: 2021-01-04

## 2021-03-19 ENCOUNTER — OFFICE VISIT (OUTPATIENT)
Dept: NEUROSURGERY | Facility: CLINIC | Age: 76
End: 2021-03-19

## 2021-03-19 VITALS — WEIGHT: 238 LBS | HEIGHT: 68 IN | BODY MASS INDEX: 36.07 KG/M2

## 2021-03-19 DIAGNOSIS — M51.36 DDD (DEGENERATIVE DISC DISEASE), LUMBAR: ICD-10-CM

## 2021-03-19 DIAGNOSIS — M51.16 LUMBAR DISC HERNIATION WITH RADICULOPATHY: Primary | ICD-10-CM

## 2021-03-19 PROCEDURE — 99203 OFFICE O/P NEW LOW 30 MIN: CPT | Performed by: NEUROLOGICAL SURGERY

## 2021-03-19 NOTE — PROGRESS NOTES
Patient: Agustín Child  : 1945    Primary Care Provider: Estefany Goldstein MD    Requesting Provider: As above        History    Chief Complaint: Low back pain.    History of Present Illness: Mr. Child is a 75-year-old retired  who is well-known to my service.  On 8/24/15 he underwent L4-5 decompressive laminectomy.  He did well.  He has had some chronic low-grade difficulties.  I saw him last in 2017 with neck and left arm symptoms.  About 3-4 weeks ago he had a severe bout of low back pain.  He has had no lower extremity pain, numbness, weakness.  He was seen in an urgent treatment center and had a cortisone shot done in his hip.  Since then his symptoms have dissipated.  His biggest complaint is fatigue, fever, chills.  All of this has ensued since his last Pfizer Covid injection.  He has no radicular lower extremity symptoms.  He denies bowel or bladder dysfunction.    Review of Systems   Constitutional: Negative for activity change, appetite change, chills, diaphoresis, fatigue, fever and unexpected weight change.   HENT: Negative for congestion, dental problem, drooling, ear discharge, ear pain, facial swelling, hearing loss, mouth sores, nosebleeds, postnasal drip, rhinorrhea, sinus pressure, sinus pain, sneezing, sore throat, tinnitus, trouble swallowing and voice change.    Eyes: Negative for photophobia, pain, discharge, redness, itching and visual disturbance.   Respiratory: Negative for apnea, cough, choking, chest tightness, shortness of breath, wheezing and stridor.    Cardiovascular: Negative for chest pain, palpitations and leg swelling.   Gastrointestinal: Negative for abdominal distention, abdominal pain, anal bleeding, blood in stool, constipation, diarrhea, nausea, rectal pain and vomiting.   Endocrine: Negative for cold intolerance, heat intolerance, polydipsia, polyphagia and polyuria.   Genitourinary: Negative for decreased urine volume, difficulty urinating,  "discharge, dysuria, enuresis, flank pain, frequency, genital sores, hematuria, penile pain, penile swelling, scrotal swelling, testicular pain and urgency.   Musculoskeletal: Negative for arthralgias, back pain, gait problem, joint swelling, myalgias, neck pain and neck stiffness.   Skin: Negative for color change, pallor, rash and wound.   Allergic/Immunologic: Negative for environmental allergies, food allergies and immunocompromised state.   Neurological: Negative for dizziness, tremors, seizures, syncope, facial asymmetry, speech difficulty, weakness, light-headedness, numbness and headaches.   Hematological: Negative for adenopathy. Does not bruise/bleed easily.   Psychiatric/Behavioral: Negative for agitation, behavioral problems, confusion, decreased concentration, dysphoric mood, hallucinations, self-injury, sleep disturbance and suicidal ideas. The patient is not nervous/anxious and is not hyperactive.        The patient's past medical history, past surgical history, family history, and social history have been reviewed at length in the electronic medical record.    Physical Exam:   Ht 172.7 cm (68\")   Wt 108 kg (238 lb)   BMI 36.19 kg/m²   CONSTITUTIONAL: Patient is well-nourished, pleasant and appears stated age.  CV: Heart regular rate and rhythm without murmur, rub, or gallop.  He gets winded fairly easily even with ambulating in the small examination room.  PULMONARY: Lungs are clear to ascultation.  MUSCULOSKELETAL:  Neck tenderness to palpation is not observed.   ROM in neck is normal.  NEUROLOGICAL:  Orientation, memory, attention span, language function, and cognition have been examined and are intact.  Strength is intact in the upper and lower extremities to direct testing.  Muscle tone is normal throughout.  Station and gait are normal.  Sensation is intact to light touch testing throughout.  Deep tendon reflexes are difficult to elicit throughout.  Deonte's Sign is negative bilaterally. No " clonus is elicited.  Coordination is intact.      Medical Decision Making    Data Review:   MRI of the lumbar spine dated 2/22/2021 demonstrates some diffuse degenerative disc disease.  There is some bilateral recess narrowing at L3-4 and at L2-3.  There is a generous left-sided disc extrusion at L1-2.  Central disc osteophyte is noted at L5-S1.  Laminectomy defect is noted at L4-5 as expected.    Diagnosis:   1.  Mechanical low back pain.  2.  Left L1-2 disc herniation without radiculopathy.  3.  Lumbar degenerative joint disease.  4.  History of lumbar laminectomy for stenosis.    Treatment Options:   Mr. Child is improving.  He has had a severe flareup related to his degenerative lumbar disease.  Fortunately, he is doing better now.  He will follow-up on an as-needed basis.       Diagnosis Plan   1. Lumbar disc herniation with radiculopathy     2. DDD (degenerative disc disease), lumbar         Scribed for Odilon Shaw MD by Priya Winston GABRIELA 3/19/2021 12:27 EDT      I, Dr. Shaw, personally performed the services described in the documentation, as scribed in my presence, and it is both accurate and complete.

## 2021-05-10 RX ORDER — AMLODIPINE BESYLATE 5 MG/1
TABLET ORAL
Qty: 90 TABLET | Refills: 3 | Status: SHIPPED | OUTPATIENT
Start: 2021-05-10

## 2021-05-10 NOTE — TELEPHONE ENCOUNTER
Medication Refill Request    Medication: amLODIPine (NORVASC) 5 MG tablet daily    Pertinent Labs:  Lab Results   Component Value Date    GLUCOSE 104 (H) 10/09/2020    BUN 17 10/09/2020    CREATININE 1.20 10/09/2020    EGFRIFNONA 59 (L) 10/09/2020    BCR 14.2 10/09/2020    K 4.1 10/09/2020    CO2 27.3 10/09/2020    CALCIUM 9.6 10/09/2020    ALBUMIN 4.10 10/09/2020    ALKPHOS 75 10/09/2020    AST 22 10/09/2020    ALT 26 10/09/2020      Lab Results   Component Value Date    CHOL 143 10/09/2020    TRIG 160 (H) 10/09/2020    HDL 41 10/09/2020    LDL 74 10/09/2020     No results found for: HGBA1C  Lab Results   Component Value Date    WBC 9.55 02/13/2018    HGB 13.6 02/13/2018    HCT 40.1 02/13/2018    MCV 87.4 02/13/2018     02/13/2018     No results found for: TSH

## 2022-02-09 RX ORDER — LOSARTAN POTASSIUM 100 MG/1
100 TABLET ORAL DAILY
Qty: 90 TABLET | Refills: 0 | Status: SHIPPED | OUTPATIENT
Start: 2022-02-09

## 2022-03-29 RX ORDER — ROSUVASTATIN CALCIUM 20 MG/1
TABLET, COATED ORAL
Qty: 90 TABLET | Refills: 3 | OUTPATIENT
Start: 2022-03-29

## 2023-03-20 ENCOUNTER — APPOINTMENT (RX ONLY)
Dept: URBAN - METROPOLITAN AREA CLINIC 82 | Facility: CLINIC | Age: 78
Setting detail: DERMATOLOGY
End: 2023-03-20

## 2023-03-20 DIAGNOSIS — L57.0 ACTINIC KERATOSIS: ICD-10-CM

## 2023-03-20 DIAGNOSIS — L72.0 EPIDERMAL CYST: ICD-10-CM

## 2023-03-20 PROCEDURE — 99212 OFFICE O/P EST SF 10 MIN: CPT | Mod: 25

## 2023-03-20 PROCEDURE — ? COUNSELING

## 2023-03-20 PROCEDURE — ? LIQUID NITROGEN

## 2023-03-20 PROCEDURE — 17003 DESTRUCT PREMALG LES 2-14: CPT

## 2023-03-20 PROCEDURE — 17000 DESTRUCT PREMALG LESION: CPT

## 2023-03-20 PROCEDURE — ? PRESCRIPTION MEDICATION MANAGEMENT

## 2023-03-20 PROCEDURE — ? PRESCRIPTION

## 2023-03-20 RX ORDER — TRETIONIN 0.1 MG/G
GEL TOPICAL
Qty: 45 | Refills: 1 | Status: ERX | COMMUNITY
Start: 2023-03-20

## 2023-03-20 RX ADMIN — TRETIONIN: 0.1 GEL TOPICAL at 00:00

## 2023-03-20 ASSESSMENT — LOCATION DETAILED DESCRIPTION DERM
LOCATION DETAILED: LEFT CENTRAL PARIETAL SCALP
LOCATION DETAILED: LEFT UPPER CUTANEOUS LIP
LOCATION DETAILED: LEFT CENTRAL MALAR CHEEK
LOCATION DETAILED: LEFT MEDIAL MALAR CHEEK
LOCATION DETAILED: LEFT SUPERIOR PARIETAL SCALP
LOCATION DETAILED: POSTERIOR MID-PARIETAL SCALP
LOCATION DETAILED: LEFT UPPER CUTANEOUS LIP

## 2023-03-20 ASSESSMENT — LOCATION ZONE DERM
LOCATION ZONE: SCALP
LOCATION ZONE: LIP
LOCATION ZONE: FACE
LOCATION ZONE: LIP
LOCATION ZONE: FACE

## 2023-03-20 ASSESSMENT — LOCATION SIMPLE DESCRIPTION DERM
LOCATION SIMPLE: SCALP
LOCATION SIMPLE: POSTERIOR SCALP
LOCATION SIMPLE: LEFT LIP
LOCATION SIMPLE: LEFT CHEEK
LOCATION SIMPLE: LEFT CHEEK
LOCATION SIMPLE: LEFT LIP

## 2023-03-20 NOTE — PROCEDURE: PRESCRIPTION MEDICATION MANAGEMENT
Detail Level: Zone
Initiate Treatment: Tretinoin. 01%
Plan: If not covered can use adapalene.
Render In Strict Bullet Format?: No

## 2023-03-20 NOTE — PROCEDURE: LIQUID NITROGEN
Consent: The patient's consent was obtained including but not limited to risks of crusting, scabbing, blistering, scarring, darker or lighter pigmentary change, recurrence, incomplete removal and infection.
Show Aperture Variable?: Yes
Duration Of Freeze Thaw-Cycle (Seconds): 10
Render Note In Bullet Format When Appropriate: No
Post-Care Instructions: I reviewed with the patient in detail post-care instructions. Patient is to wear sunprotection, and avoid picking at any of the treated lesions. Pt may apply Vaseline to crusted or scabbing areas.
Detail Level: Detailed

## 2023-05-23 ENCOUNTER — APPOINTMENT (RX ONLY)
Dept: URBAN - METROPOLITAN AREA CLINIC 82 | Facility: CLINIC | Age: 78
Setting detail: DERMATOLOGY
End: 2023-05-23

## 2023-05-23 DIAGNOSIS — L57.0 ACTINIC KERATOSIS: ICD-10-CM

## 2023-05-23 PROCEDURE — 17003 DESTRUCT PREMALG LES 2-14: CPT

## 2023-05-23 PROCEDURE — ? LIQUID NITROGEN

## 2023-05-23 PROCEDURE — 17000 DESTRUCT PREMALG LESION: CPT

## 2023-05-23 PROCEDURE — ? COUNSELING

## 2023-05-23 ASSESSMENT — LOCATION SIMPLE DESCRIPTION DERM
LOCATION SIMPLE: LEFT SCALP
LOCATION SIMPLE: RIGHT FOREHEAD
LOCATION SIMPLE: RIGHT SCALP
LOCATION SIMPLE: SCALP

## 2023-05-23 ASSESSMENT — LOCATION DETAILED DESCRIPTION DERM
LOCATION DETAILED: RIGHT SUPERIOR PARIETAL SCALP
LOCATION DETAILED: RIGHT CENTRAL PARIETAL SCALP
LOCATION DETAILED: RIGHT SUPERIOR MEDIAL FOREHEAD
LOCATION DETAILED: RIGHT CENTRAL FRONTAL SCALP
LOCATION DETAILED: LEFT MEDIAL FRONTAL SCALP

## 2023-05-23 ASSESSMENT — LOCATION ZONE DERM
LOCATION ZONE: FACE
LOCATION ZONE: SCALP

## 2023-07-11 ENCOUNTER — APPOINTMENT (RX ONLY)
Dept: URBAN - METROPOLITAN AREA CLINIC 82 | Facility: CLINIC | Age: 78
Setting detail: DERMATOLOGY
End: 2023-07-11

## 2023-07-11 DIAGNOSIS — L57.8 OTHER SKIN CHANGES DUE TO CHRONIC EXPOSURE TO NONIONIZING RADIATION: ICD-10-CM | Status: INADEQUATELY CONTROLLED

## 2023-07-11 DIAGNOSIS — D18.0 HEMANGIOMA: ICD-10-CM

## 2023-07-11 DIAGNOSIS — Z71.89 OTHER SPECIFIED COUNSELING: ICD-10-CM

## 2023-07-11 DIAGNOSIS — L81.4 OTHER MELANIN HYPERPIGMENTATION: ICD-10-CM

## 2023-07-11 DIAGNOSIS — D22 MELANOCYTIC NEVI: ICD-10-CM

## 2023-07-11 DIAGNOSIS — L82.1 OTHER SEBORRHEIC KERATOSIS: ICD-10-CM

## 2023-07-11 PROBLEM — D18.01 HEMANGIOMA OF SKIN AND SUBCUTANEOUS TISSUE: Status: ACTIVE | Noted: 2023-07-11

## 2023-07-11 PROBLEM — D22.5 MELANOCYTIC NEVI OF TRUNK: Status: ACTIVE | Noted: 2023-07-11

## 2023-07-11 PROCEDURE — ? SUNSCREEN TREATMENT REGIMEN

## 2023-07-11 PROCEDURE — ? SUNSCREEN RECOMMENDATIONS

## 2023-07-11 PROCEDURE — ? COUNSELING

## 2023-07-11 PROCEDURE — 99213 OFFICE O/P EST LOW 20 MIN: CPT

## 2023-07-11 ASSESSMENT — LOCATION SIMPLE DESCRIPTION DERM
LOCATION SIMPLE: LEFT SHOULDER
LOCATION SIMPLE: LEFT HAND
LOCATION SIMPLE: CHEST
LOCATION SIMPLE: RIGHT LOWER BACK
LOCATION SIMPLE: ABDOMEN
LOCATION SIMPLE: LEFT UPPER BACK
LOCATION SIMPLE: RIGHT BACK
LOCATION SIMPLE: LEFT LOWER BACK
LOCATION SIMPLE: RIGHT UPPER BACK
LOCATION SIMPLE: RIGHT CHEEK
LOCATION SIMPLE: UPPER BACK
LOCATION SIMPLE: LEFT FOREARM
LOCATION SIMPLE: RIGHT FOREARM
LOCATION SIMPLE: LEFT CHEEK
LOCATION SIMPLE: RIGHT HAND
LOCATION SIMPLE: RIGHT ANTERIOR NECK
LOCATION SIMPLE: LEFT FOREHEAD
LOCATION SIMPLE: RIGHT SHOULDER
LOCATION SIMPLE: RIGHT FOREHEAD
LOCATION SIMPLE: RIGHT UPPER ARM
LOCATION SIMPLE: LEFT ANTERIOR NECK

## 2023-07-11 ASSESSMENT — LOCATION DETAILED DESCRIPTION DERM
LOCATION DETAILED: RIGHT ANTERIOR DISTAL UPPER ARM
LOCATION DETAILED: LEFT CENTRAL MALAR CHEEK
LOCATION DETAILED: LEFT SUPERIOR UPPER BACK
LOCATION DETAILED: INFERIOR THORACIC SPINE
LOCATION DETAILED: RIGHT CENTRAL MALAR CHEEK
LOCATION DETAILED: LEFT PROXIMAL DORSAL FOREARM
LOCATION DETAILED: PERIUMBILICAL SKIN
LOCATION DETAILED: SUBXIPHOID
LOCATION DETAILED: RIGHT DISTAL DORSAL FOREARM
LOCATION DETAILED: RIGHT INFERIOR UPPER BACK
LOCATION DETAILED: LEFT LATERAL INFERIOR CHEST
LOCATION DETAILED: LEFT INFERIOR LATERAL MIDBACK
LOCATION DETAILED: LEFT MEDIAL UPPER BACK
LOCATION DETAILED: LEFT POSTERIOR SHOULDER
LOCATION DETAILED: RIGHT MEDIAL INFERIOR CHEST
LOCATION DETAILED: RIGHT LATERAL ABDOMEN
LOCATION DETAILED: RIGHT SUPERIOR LATERAL UPPER BACK
LOCATION DETAILED: EPIGASTRIC SKIN
LOCATION DETAILED: RIGHT INFERIOR ANTERIOR NECK
LOCATION DETAILED: LEFT DISTAL DORSAL FOREARM
LOCATION DETAILED: RIGHT INFERIOR FOREHEAD
LOCATION DETAILED: LEFT INFERIOR FOREHEAD
LOCATION DETAILED: RIGHT FOREHEAD
LOCATION DETAILED: RIGHT SUPERIOR UPPER BACK
LOCATION DETAILED: RIGHT MID-UPPER BACK
LOCATION DETAILED: RIGHT PROXIMAL DORSAL FOREARM
LOCATION DETAILED: LEFT MEDIAL SUPERIOR CHEST
LOCATION DETAILED: LEFT INFERIOR ANTERIOR NECK
LOCATION DETAILED: LEFT SUPERIOR LATERAL MIDBACK
LOCATION DETAILED: LEFT MID-UPPER BACK
LOCATION DETAILED: LEFT SUPERIOR MEDIAL MIDBACK
LOCATION DETAILED: RIGHT POSTERIOR SHOULDER
LOCATION DETAILED: LEFT LATERAL ABDOMEN
LOCATION DETAILED: RIGHT RADIAL DORSAL HAND
LOCATION DETAILED: RIGHT CLAVICULAR NECK
LOCATION DETAILED: RIGHT SUPERIOR MEDIAL MIDBACK
LOCATION DETAILED: LEFT RADIAL DORSAL HAND

## 2023-07-11 ASSESSMENT — LOCATION ZONE DERM
LOCATION ZONE: ARM
LOCATION ZONE: HAND
LOCATION ZONE: NECK
LOCATION ZONE: FACE
LOCATION ZONE: TRUNK

## 2024-01-15 ENCOUNTER — APPOINTMENT (RX ONLY)
Dept: URBAN - METROPOLITAN AREA CLINIC 82 | Facility: CLINIC | Age: 79
Setting detail: DERMATOLOGY
End: 2024-01-15

## 2024-01-15 DIAGNOSIS — D18.0 HEMANGIOMA: ICD-10-CM

## 2024-01-15 DIAGNOSIS — L70.0 ACNE VULGARIS: ICD-10-CM

## 2024-01-15 DIAGNOSIS — L81.4 OTHER MELANIN HYPERPIGMENTATION: ICD-10-CM

## 2024-01-15 DIAGNOSIS — D22 MELANOCYTIC NEVI: ICD-10-CM

## 2024-01-15 DIAGNOSIS — I87.2 VENOUS INSUFFICIENCY (CHRONIC) (PERIPHERAL): ICD-10-CM

## 2024-01-15 DIAGNOSIS — L82.1 OTHER SEBORRHEIC KERATOSIS: ICD-10-CM

## 2024-01-15 DIAGNOSIS — Z71.89 OTHER SPECIFIED COUNSELING: ICD-10-CM

## 2024-01-15 DIAGNOSIS — L57.8 OTHER SKIN CHANGES DUE TO CHRONIC EXPOSURE TO NONIONIZING RADIATION: ICD-10-CM | Status: INADEQUATELY CONTROLLED

## 2024-01-15 PROBLEM — D22.5 MELANOCYTIC NEVI OF TRUNK: Status: ACTIVE | Noted: 2024-01-15

## 2024-01-15 PROBLEM — D18.01 HEMANGIOMA OF SKIN AND SUBCUTANEOUS TISSUE: Status: ACTIVE | Noted: 2024-01-15

## 2024-01-15 PROCEDURE — 99214 OFFICE O/P EST MOD 30 MIN: CPT

## 2024-01-15 PROCEDURE — ? COUNSELING

## 2024-01-15 PROCEDURE — ? PRESCRIPTION

## 2024-01-15 PROCEDURE — ? SUNSCREEN RECOMMENDATIONS

## 2024-01-15 PROCEDURE — ? ADDITIONAL NOTES

## 2024-01-15 PROCEDURE — ? SUNSCREEN TREATMENT REGIMEN

## 2024-01-15 PROCEDURE — ? PRESCRIPTION MEDICATION MANAGEMENT

## 2024-01-15 RX ORDER — TRIAMCINOLONE ACETONIDE 1 MG/G
CREAM TOPICAL
Qty: 454 | Refills: 2 | Status: ERX | COMMUNITY
Start: 2024-01-15

## 2024-01-15 RX ADMIN — TRIAMCINOLONE ACETONIDE: 1 CREAM TOPICAL at 00:00

## 2024-01-15 ASSESSMENT — LOCATION DETAILED DESCRIPTION DERM
LOCATION DETAILED: LEFT POSTERIOR SHOULDER
LOCATION DETAILED: LEFT INFERIOR FOREHEAD
LOCATION DETAILED: LEFT RADIAL DORSAL HAND
LOCATION DETAILED: LEFT CENTRAL MALAR CHEEK
LOCATION DETAILED: RIGHT SUPERIOR UPPER BACK
LOCATION DETAILED: RIGHT RADIAL DORSAL HAND
LOCATION DETAILED: LEFT SUPERIOR LATERAL MIDBACK
LOCATION DETAILED: LEFT LATERAL ABDOMEN
LOCATION DETAILED: LEFT SUPERIOR UPPER BACK
LOCATION DETAILED: RIGHT SUPERIOR LATERAL UPPER BACK
LOCATION DETAILED: LEFT MID-UPPER BACK
LOCATION DETAILED: RIGHT CENTRAL MALAR CHEEK
LOCATION DETAILED: LEFT DISTAL DORSAL FOREARM
LOCATION DETAILED: RIGHT PROXIMAL DORSAL FOREARM
LOCATION DETAILED: RIGHT CLAVICULAR NECK
LOCATION DETAILED: RIGHT LATERAL ABDOMEN
LOCATION DETAILED: RIGHT INFERIOR ANTERIOR NECK
LOCATION DETAILED: LEFT PROXIMAL PRETIBIAL REGION
LOCATION DETAILED: LEFT MEDIAL SUPERIOR CHEST
LOCATION DETAILED: RIGHT MID-UPPER BACK
LOCATION DETAILED: RIGHT ANTERIOR DISTAL UPPER ARM
LOCATION DETAILED: RIGHT POSTERIOR SHOULDER
LOCATION DETAILED: RIGHT INFERIOR UPPER BACK
LOCATION DETAILED: SUBXIPHOID
LOCATION DETAILED: LEFT INFERIOR ANTERIOR NECK
LOCATION DETAILED: RIGHT DISTAL DORSAL FOREARM
LOCATION DETAILED: RIGHT INFERIOR FOREHEAD
LOCATION DETAILED: RIGHT PROXIMAL PRETIBIAL REGION
LOCATION DETAILED: RIGHT FOREHEAD
LOCATION DETAILED: LEFT PROXIMAL DORSAL FOREARM
LOCATION DETAILED: RIGHT SUPERIOR MEDIAL MIDBACK

## 2024-01-15 ASSESSMENT — LOCATION SIMPLE DESCRIPTION DERM
LOCATION SIMPLE: RIGHT UPPER ARM
LOCATION SIMPLE: LEFT LOWER BACK
LOCATION SIMPLE: RIGHT LOWER BACK
LOCATION SIMPLE: RIGHT FOREHEAD
LOCATION SIMPLE: RIGHT PRETIBIAL REGION
LOCATION SIMPLE: RIGHT BACK
LOCATION SIMPLE: LEFT SHOULDER
LOCATION SIMPLE: RIGHT FOREARM
LOCATION SIMPLE: ABDOMEN
LOCATION SIMPLE: CHEST
LOCATION SIMPLE: LEFT UPPER BACK
LOCATION SIMPLE: LEFT PRETIBIAL REGION
LOCATION SIMPLE: RIGHT SHOULDER
LOCATION SIMPLE: LEFT FOREARM
LOCATION SIMPLE: RIGHT UPPER BACK
LOCATION SIMPLE: LEFT FOREHEAD
LOCATION SIMPLE: LEFT CHEEK
LOCATION SIMPLE: RIGHT HAND
LOCATION SIMPLE: RIGHT CHEEK
LOCATION SIMPLE: RIGHT ANTERIOR NECK
LOCATION SIMPLE: LEFT HAND
LOCATION SIMPLE: LEFT ANTERIOR NECK

## 2024-01-15 ASSESSMENT — LOCATION ZONE DERM
LOCATION ZONE: HAND
LOCATION ZONE: ARM
LOCATION ZONE: FACE
LOCATION ZONE: LEG
LOCATION ZONE: TRUNK
LOCATION ZONE: NECK

## 2024-01-15 NOTE — PROCEDURE: PRESCRIPTION MEDICATION MANAGEMENT
Render In Strict Bullet Format?: No
Detail Level: Zone
Initiate Treatment: Triamcinolone 0.1% cream apply to lower legs BID x 2-3 weeks, PRN flares.
Modify Regimen: Advised to wean off the prednisone 20mg, a total of 40mg a day.

## 2024-01-15 NOTE — PROCEDURE: COUNSELING
Detail Level: Zone
Detail Level: Simple
Detail Level: Detailed
Prescription Strength Graduated Compression Stockings Recommendations: The patient was counseled that prescription strength graduated compression stockings should be worn for all waking hours. They will follow up with a venous specialist to monitor graduated compression stocking usage and their symptoms.
Isotretinoin Pregnancy And Lactation Text: This medication is Pregnancy Category X and is considered extremely dangerous during pregnancy. It is unknown if it is excreted in breast milk.
Spironolactone Counseling: Patient advised regarding risks of diarrhea, abdominal pain, hyperkalemia, birth defects (for female patients), liver toxicity and renal toxicity. The patient may need blood work to monitor liver and kidney function and potassium levels while on therapy. The patient verbalized understanding of the proper use and possible adverse effects of spironolactone.  All of the patient's questions and concerns were addressed.
Azelaic Acid Pregnancy And Lactation Text: This medication is considered safe during pregnancy and breast feeding.
Dapsone Counseling: I discussed with the patient the risks of dapsone including but not limited to hemolytic anemia, agranulocytosis, rashes, methemoglobinemia, kidney failure, peripheral neuropathy, headaches, GI upset, and liver toxicity.  Patients who start dapsone require monitoring including baseline LFTs and weekly CBCs for the first month, then every month thereafter.  The patient verbalized understanding of the proper use and possible adverse effects of dapsone.  All of the patient's questions and concerns were addressed.
Birth Control Pills Counseling: Birth Control Pill Counseling: I discussed with the patient the potential side effects of OCPs including but not limited to increased risk of stroke, heart attack, thrombophlebitis, deep venous thrombosis, hepatic adenomas, breast changes, GI upset, headaches, and depression.  The patient verbalized understanding of the proper use and possible adverse effects of OCPs. All of the patient's questions and concerns were addressed.
Topical Clindamycin Counseling: Patient counseled that this medication may cause skin irritation or allergic reactions.  In the event of skin irritation, the patient was advised to reduce the amount of the drug applied or use it less frequently.   The patient verbalized understanding of the proper use and possible adverse effects of clindamycin.  All of the patient's questions and concerns were addressed.
Winlevi Pregnancy And Lactation Text: This medication is considered safe during pregnancy and breastfeeding.
Erythromycin Pregnancy And Lactation Text: This medication is Pregnancy Category B and is considered safe during pregnancy. It is also excreted in breast milk.
Sarecycline Counseling: Patient advised regarding possible photosensitivity and discoloration of the teeth, skin, lips, tongue and gums.  Patient instructed to avoid sunlight, if possible.  When exposed to sunlight, patients should wear protective clothing, sunglasses, and sunscreen.  The patient was instructed to call the office immediately if the following severe adverse effects occur:  hearing changes, easy bruising/bleeding, severe headache, or vision changes.  The patient verbalized understanding of the proper use and possible adverse effects of sarecycline.  All of the patient's questions and concerns were addressed.
Aklief Pregnancy And Lactation Text: It is unknown if this medication is safe to use during pregnancy.  It is unknown if this medication is excreted in breast milk.  Breastfeeding women should use the topical cream on the smallest area of the skin for the shortest time needed while breastfeeding.  Do not apply to nipple and areola.
Tazorac Counseling:  Patient advised that medication is irritating and drying.  Patient may need to apply sparingly and wash off after an hour before eventually leaving it on overnight.  The patient verbalized understanding of the proper use and possible adverse effects of tazorac.  All of the patient's questions and concerns were addressed.
Use Enhanced Medication Counseling?: No
Bactrim Counseling:  I discussed with the patient the risks of sulfa antibiotics including but not limited to GI upset, allergic reaction, drug rash, diarrhea, dizziness, photosensitivity, and yeast infections.  Rarely, more serious reactions can occur including but not limited to aplastic anemia, agranulocytosis, methemoglobinemia, blood dyscrasias, liver or kidney failure, lung infiltrates or desquamative/blistering drug rashes.
Doxycycline Pregnancy And Lactation Text: This medication is Pregnancy Category D and not consider safe during pregnancy. It is also excreted in breast milk but is considered safe for shorter treatment courses.
Topical Clindamycin Pregnancy And Lactation Text: This medication is Pregnancy Category B and is considered safe during pregnancy. It is unknown if it is excreted in breast milk.
Minocycline Counseling: Patient advised regarding possible photosensitivity and discoloration of the teeth, skin, lips, tongue and gums.  Patient instructed to avoid sunlight, if possible.  When exposed to sunlight, patients should wear protective clothing, sunglasses, and sunscreen.  The patient was instructed to call the office immediately if the following severe adverse effects occur:  hearing changes, easy bruising/bleeding, severe headache, or vision changes.  The patient verbalized understanding of the proper use and possible adverse effects of minocycline.  All of the patient's questions and concerns were addressed.
Tetracycline Pregnancy And Lactation Text: This medication is Pregnancy Category D and not consider safe during pregnancy. It is also excreted in breast milk.
Topical Retinoid counseling:  Patient advised to apply a pea-sized amount only at bedtime and wait 30 minutes after washing their face before applying.  If too drying, patient may add a non-comedogenic moisturizer. The patient verbalized understanding of the proper use and possible adverse effects of retinoids.  All of the patient's questions and concerns were addressed.
Topical Sulfur Applications Pregnancy And Lactation Text: This medication is Pregnancy Category C and has an unknown safety profile during pregnancy. It is unknown if this topical medication is excreted in breast milk.
Azithromycin Counseling:  I discussed with the patient the risks of azithromycin including but not limited to GI upset, allergic reaction, drug rash, diarrhea, and yeast infections.
Dapsone Pregnancy And Lactation Text: This medication is Pregnancy Category C and is not considered safe during pregnancy or breast feeding.
Benzoyl Peroxide Counseling: Patient counseled that medicine may cause skin irritation and bleach clothing.  In the event of skin irritation, the patient was advised to reduce the amount of the drug applied or use it less frequently.   The patient verbalized understanding of the proper use and possible adverse effects of benzoyl peroxide.  All of the patient's questions and concerns were addressed.
Birth Control Pills Pregnancy And Lactation Text: This medication should be avoided if pregnant and for the first 30 days post-partum.
Spironolactone Pregnancy And Lactation Text: This medication can cause feminization of the male fetus and should be avoided during pregnancy. The active metabolite is also found in breast milk.
High Dose Vitamin A Counseling: Side effects reviewed, pt to contact office should one occur.
Bactrim Pregnancy And Lactation Text: This medication is Pregnancy Category D and is known to cause fetal risk.  It is also excreted in breast milk.
Isotretinoin Counseling: Patient should get monthly blood tests, not donate blood, not drive at night if vision affected, not share medication, and not undergo elective surgery for 6 months after tx completed. Side effects reviewed, pt to contact office should one occur.
Azelaic Acid Counseling: Patient counseled that medicine may cause skin irritation and to avoid applying near the eyes.  In the event of skin irritation, the patient was advised to reduce the amount of the drug applied or use it less frequently.   The patient verbalized understanding of the proper use and possible adverse effects of azelaic acid.  All of the patient's questions and concerns were addressed.
Tazorac Pregnancy And Lactation Text: This medication is not safe during pregnancy. It is unknown if this medication is excreted in breast milk.
Patient Specific Counseling (Will Not Stick From Patient To Patient): Pt stated it doesn’t bother him, and to leave it alone.
Aklief counseling:  Patient advised to apply a pea-sized amount only at bedtime and wait 30 minutes after washing their face before applying.  If too drying, patient may add a non-comedogenic moisturizer.  The most commonly reported side effects including irritation, redness, scaling, dryness, stinging, burning, itching, and increased risk of sunburn.  The patient verbalized understanding of the proper use and possible adverse effects of retinoids.  All of the patient's questions and concerns were addressed.
Topical Retinoid Pregnancy And Lactation Text: This medication is Pregnancy Category C. It is unknown if this medication is excreted in breast milk.
Erythromycin Counseling:  I discussed with the patient the risks of erythromycin including but not limited to GI upset, allergic reaction, drug rash, diarrhea, increase in liver enzymes, and yeast infections.
Azithromycin Pregnancy And Lactation Text: This medication is considered safe during pregnancy and is also secreted in breast milk.
Doxycycline Counseling:  Patient counseled regarding possible photosensitivity and increased risk for sunburn.  Patient instructed to avoid sunlight, if possible.  When exposed to sunlight, patients should wear protective clothing, sunglasses, and sunscreen.  The patient was instructed to call the office immediately if the following severe adverse effects occur:  hearing changes, easy bruising/bleeding, severe headache, or vision changes.  The patient verbalized understanding of the proper use and possible adverse effects of doxycycline.  All of the patient's questions and concerns were addressed.
Winlevi Counseling:  I discussed with the patient the risks of topical clascoterone including but not limited to erythema, scaling, itching, and stinging. Patient voiced their understanding.
Topical Sulfur Applications Counseling: Topical Sulfur Counseling: Patient counseled that this medication may cause skin irritation or allergic reactions.  In the event of skin irritation, the patient was advised to reduce the amount of the drug applied or use it less frequently.   The patient verbalized understanding of the proper use and possible adverse effects of topical sulfur application.  All of the patient's questions and concerns were addressed.
Tetracycline Counseling: Patient counseled regarding possible photosensitivity and increased risk for sunburn.  Patient instructed to avoid sunlight, if possible.  When exposed to sunlight, patients should wear protective clothing, sunglasses, and sunscreen.  The patient was instructed to call the office immediately if the following severe adverse effects occur:  hearing changes, easy bruising/bleeding, severe headache, or vision changes.  The patient verbalized understanding of the proper use and possible adverse effects of tetracycline.  All of the patient's questions and concerns were addressed. Patient understands to avoid pregnancy while on therapy due to potential birth defects.
High Dose Vitamin A Pregnancy And Lactation Text: High dose vitamin A therapy is contraindicated during pregnancy and breast feeding.
Benzoyl Peroxide Pregnancy And Lactation Text: This medication is Pregnancy Category C. It is unknown if benzoyl peroxide is excreted in breast milk.

## 2024-01-15 NOTE — PROCEDURE: ADDITIONAL NOTES
Additional Notes: Pt was out of town and was given prednisone 20 mg twice daily, total 40 mg a day since about a month ago.\\nPt had a knee replacement on right knee last summer and could cause swelling.
Detail Level: Simple
Render Risk Assessment In Note?: no